# Patient Record
Sex: FEMALE | Employment: OTHER | URBAN - METROPOLITAN AREA
[De-identification: names, ages, dates, MRNs, and addresses within clinical notes are randomized per-mention and may not be internally consistent; named-entity substitution may affect disease eponyms.]

---

## 2024-03-01 ENCOUNTER — OFFICE VISIT (OUTPATIENT)
Facility: CLINIC | Age: 74
End: 2024-03-01

## 2024-03-01 DIAGNOSIS — I10 PRIMARY HYPERTENSION: ICD-10-CM

## 2024-03-01 DIAGNOSIS — O22.30 DVT (DEEP VEIN THROMBOSIS) IN PREGNANCY: ICD-10-CM

## 2024-03-01 DIAGNOSIS — K21.9 GASTROESOPHAGEAL REFLUX DISEASE WITHOUT ESOPHAGITIS: ICD-10-CM

## 2024-03-01 DIAGNOSIS — J96.01 ACUTE HYPOXIC RESPIRATORY FAILURE (HCC): Primary | ICD-10-CM

## 2024-03-01 DIAGNOSIS — I26.02 ACUTE SADDLE PULMONARY EMBOLISM WITH ACUTE COR PULMONALE (HCC): ICD-10-CM

## 2024-03-01 DIAGNOSIS — G12.21 ALS (AMYOTROPHIC LATERAL SCLEROSIS) (HCC): ICD-10-CM

## 2024-03-01 DIAGNOSIS — I63.9 CEREBROVASCULAR ACCIDENT (CVA), UNSPECIFIED MECHANISM (HCC): ICD-10-CM

## 2024-03-01 NOTE — PROGRESS NOTES
distress;  Gastrointestinal: Abdomen soft, NT, ND, no masses, normal bowel sounds;  Neurologic: Cranial nerves II through VII grossly intact, no speech or motor deficits A&O in time place and person  Skin: No rash, warm and dry;  Musculoskeletal: No erythema swelling or joint tenderness, extremities without cyanosis, neck supple, ROM intact spine and extremities;  Psychiatric: Not agitated.  Appropriate affect, mood, judgment and insight.  Genitourinary: No suprapubic tenderness or flank tenderness  Heme, lymph, immuno: No pallor;       Labs: Hemoglobin 16.3 platelet 265 sodium 137 creatinine 0.6 calcium 9.6 magnesium 2.0 albumin 3.7 BUN 22    Assessment/Plans:    Diagnosis Orders   1. Acute hypoxic respiratory failure (HCC)     Secondary to pulmonary embolism  Continue oxygen.  Continue Eliquis  High risk for decompensation and readmission to hospital.  Risk of morbidity and mortality high  Monitor symptoms   2. Acute saddle pulmonary embolism with acute cor pulmonale (HCC)     S/p Thrombectomy.   Continue Eliquis   3. DVT (deep vein thrombosis) in pregnancy     S/p IVC filter.  Continue Eliquis   4. Primary hypertension     Well-controlled.  Continue to monitor   5. ALS (amyotrophic lateral sclerosis) (HCC)     Continue Riluzole.  Follow-up neurology   6. Gastroesophageal reflux disease without esophagitis     Continue PPI   7. Cerebrovascular accident (CVA), unspecified mechanism (HCC)     Continues eliquis.             Megan Alarcon MD

## 2024-03-04 ENCOUNTER — OFFICE VISIT (OUTPATIENT)
Facility: CLINIC | Age: 74
End: 2024-03-04
Payer: MEDICARE

## 2024-03-04 VITALS
RESPIRATION RATE: 18 BRPM | TEMPERATURE: 98.2 F | SYSTOLIC BLOOD PRESSURE: 152 MMHG | WEIGHT: 182 LBS | HEART RATE: 86 BPM | DIASTOLIC BLOOD PRESSURE: 62 MMHG | OXYGEN SATURATION: 98 %

## 2024-03-04 DIAGNOSIS — J96.01 ACUTE HYPOXIC RESPIRATORY FAILURE (HCC): Primary | ICD-10-CM

## 2024-03-04 DIAGNOSIS — I10 PRIMARY HYPERTENSION: ICD-10-CM

## 2024-03-04 DIAGNOSIS — G12.21 ALS (AMYOTROPHIC LATERAL SCLEROSIS) (HCC): ICD-10-CM

## 2024-03-04 DIAGNOSIS — K21.9 GASTROESOPHAGEAL REFLUX DISEASE WITHOUT ESOPHAGITIS: ICD-10-CM

## 2024-03-04 DIAGNOSIS — I63.9 CEREBROVASCULAR ACCIDENT (CVA), UNSPECIFIED MECHANISM (HCC): ICD-10-CM

## 2024-03-04 DIAGNOSIS — I82.4Y9 ACUTE DEEP VEIN THROMBOSIS (DVT) OF PROXIMAL VEIN OF LOWER EXTREMITY, UNSPECIFIED LATERALITY (HCC): ICD-10-CM

## 2024-03-04 DIAGNOSIS — I26.02 ACUTE SADDLE PULMONARY EMBOLISM WITH ACUTE COR PULMONALE (HCC): ICD-10-CM

## 2024-03-04 PROCEDURE — 99309 SBSQ NF CARE MODERATE MDM 30: CPT | Performed by: NURSE PRACTITIONER

## 2024-03-04 PROCEDURE — 1123F ACP DISCUSS/DSCN MKR DOCD: CPT | Performed by: NURSE PRACTITIONER

## 2024-03-04 PROCEDURE — G8484 FLU IMMUNIZE NO ADMIN: HCPCS | Performed by: NURSE PRACTITIONER

## 2024-03-04 RX ORDER — AMLODIPINE BESYLATE 5 MG/1
5 TABLET ORAL DAILY
COMMUNITY

## 2024-03-04 RX ORDER — NITROFURANTOIN MACROCRYSTALS 100 MG/1
100 CAPSULE ORAL 2 TIMES DAILY
COMMUNITY

## 2024-03-04 RX ORDER — LISINOPRIL 20 MG/1
20 TABLET ORAL DAILY
COMMUNITY

## 2024-03-04 RX ORDER — GLUC/MSM/COLGN2/HYAL/ANTIARTH3 375-375-20
1 TABLET ORAL DAILY
COMMUNITY

## 2024-03-04 RX ORDER — ATORVASTATIN CALCIUM 20 MG/1
20 TABLET, FILM COATED ORAL NIGHTLY
COMMUNITY

## 2024-03-04 RX ORDER — LORAZEPAM 1 MG/1
1 TABLET ORAL DAILY PRN
COMMUNITY

## 2024-03-04 RX ORDER — RILUZOLE 50 MG/1
50 TABLET, FILM COATED ORAL EVERY 12 HOURS
COMMUNITY

## 2024-03-04 RX ORDER — OMEPRAZOLE 20 MG/1
20 CAPSULE, DELAYED RELEASE ORAL DAILY
COMMUNITY

## 2024-03-04 NOTE — PROGRESS NOTES
[facility] [place] [address] [place] [address]  Phone: [phone]  Fax: [phone]  Also available via [employer]     PLACE OF SERVICE:  [facility] [address] [place] [address]    SKILLED VISIT    Chief Complaint:   Chief Complaint   Patient presents with    Follow-up         HPI : Cyndi Doran is a 73 y.o. female here for follow up.    Previous history prior to admission:  Cyndi Doran is a 73 y.o. female with history of ALS, stroke presented to hospital with complaints of acute shortness of breath.  She was found to tachypneic and hypoxic CTA lung showed large saddle pulmonary embolism.  Patient underwent thrombectomy and IVC filter placement on 2/18/24. She has been started on Eliquis for anticoagulation.  After discharge from hospital she is now admitted to [facility] for skilled rehab.She continues on oxygen and gets short of breath with minimal exertion. She will continue follow-up with neurology for her possible ALS diagnosis. Continues on Riluzole. She  has history of stroke.   had MRI of the brain which showed right thalamic infarct.  She continues on Eliquis.    Current visit:  Patient is lying in bed today she is awake alert pleasant.  Vital signs reviewed blood pressures ranging 130-1 50 she is afebrile heart rate is rate controlled she denies any lightheadedness dizziness.  She does report constipation today.  Reports success with MiraLAX in the past we will order stat dose of MiraLAX and then start on this daily.  She is eating 25 to 50%.  She notes that she is not a fan of the food.  Will discuss to see if food preferences can be done.  Patient has a history of left foot drop and weakness in the left leg she notes started from a fall sometime ago.  She has new foot drop boots at the bedside.  She has a work up diagnosis for ALS.  She was seen provider in [facility] and notes that she is transferred

## 2024-03-06 ENCOUNTER — OFFICE VISIT (OUTPATIENT)
Facility: CLINIC | Age: 74
End: 2024-03-06
Payer: MEDICARE

## 2024-03-06 VITALS
HEART RATE: 86 BPM | WEIGHT: 187 LBS | SYSTOLIC BLOOD PRESSURE: 99 MMHG | DIASTOLIC BLOOD PRESSURE: 60 MMHG | OXYGEN SATURATION: 95 % | RESPIRATION RATE: 18 BRPM | TEMPERATURE: 98.5 F

## 2024-03-06 DIAGNOSIS — G12.21 ALS (AMYOTROPHIC LATERAL SCLEROSIS) (HCC): ICD-10-CM

## 2024-03-06 DIAGNOSIS — J96.01 ACUTE HYPOXIC RESPIRATORY FAILURE (HCC): Primary | ICD-10-CM

## 2024-03-06 DIAGNOSIS — I63.9 CEREBROVASCULAR ACCIDENT (CVA), UNSPECIFIED MECHANISM (HCC): ICD-10-CM

## 2024-03-06 DIAGNOSIS — O22.30 DVT (DEEP VEIN THROMBOSIS) IN PREGNANCY: ICD-10-CM

## 2024-03-06 DIAGNOSIS — K59.01 SLOW TRANSIT CONSTIPATION: ICD-10-CM

## 2024-03-06 DIAGNOSIS — K21.9 GASTROESOPHAGEAL REFLUX DISEASE WITHOUT ESOPHAGITIS: ICD-10-CM

## 2024-03-06 DIAGNOSIS — I10 PRIMARY HYPERTENSION: ICD-10-CM

## 2024-03-06 DIAGNOSIS — I26.02 ACUTE SADDLE PULMONARY EMBOLISM WITH ACUTE COR PULMONALE (HCC): ICD-10-CM

## 2024-03-06 PROCEDURE — 1123F ACP DISCUSS/DSCN MKR DOCD: CPT | Performed by: NURSE PRACTITIONER

## 2024-03-06 PROCEDURE — 99309 SBSQ NF CARE MODERATE MDM 30: CPT | Performed by: NURSE PRACTITIONER

## 2024-03-06 PROCEDURE — G8484 FLU IMMUNIZE NO ADMIN: HCPCS | Performed by: NURSE PRACTITIONER

## 2024-03-08 ENCOUNTER — OFFICE VISIT (OUTPATIENT)
Facility: CLINIC | Age: 74
End: 2024-03-08

## 2024-03-08 VITALS
SYSTOLIC BLOOD PRESSURE: 136 MMHG | WEIGHT: 187 LBS | DIASTOLIC BLOOD PRESSURE: 79 MMHG | RESPIRATION RATE: 18 BRPM | HEART RATE: 77 BPM | TEMPERATURE: 97.4 F | OXYGEN SATURATION: 99 %

## 2024-03-08 DIAGNOSIS — I82.4Y9 ACUTE DEEP VEIN THROMBOSIS (DVT) OF PROXIMAL VEIN OF LOWER EXTREMITY, UNSPECIFIED LATERALITY (HCC): ICD-10-CM

## 2024-03-08 DIAGNOSIS — J96.01 ACUTE HYPOXIC RESPIRATORY FAILURE (HCC): Primary | ICD-10-CM

## 2024-03-08 DIAGNOSIS — K59.01 SLOW TRANSIT CONSTIPATION: ICD-10-CM

## 2024-03-08 DIAGNOSIS — G12.21 ALS (AMYOTROPHIC LATERAL SCLEROSIS) (HCC): ICD-10-CM

## 2024-03-08 DIAGNOSIS — R53.83 OTHER FATIGUE: ICD-10-CM

## 2024-03-08 DIAGNOSIS — K21.9 GASTROESOPHAGEAL REFLUX DISEASE WITHOUT ESOPHAGITIS: ICD-10-CM

## 2024-03-08 DIAGNOSIS — I63.9 CEREBROVASCULAR ACCIDENT (CVA), UNSPECIFIED MECHANISM (HCC): ICD-10-CM

## 2024-03-08 DIAGNOSIS — I10 PRIMARY HYPERTENSION: ICD-10-CM

## 2024-03-08 DIAGNOSIS — I26.02 ACUTE SADDLE PULMONARY EMBOLISM WITH ACUTE COR PULMONALE (HCC): ICD-10-CM

## 2024-03-08 NOTE — PROGRESS NOTES
[facility] [place] [address] [place] [address]  Phone: [phone]  Fax: [phone]  Also available via [employer]     PLACE OF SERVICE:  [facility] [address] [place] [address]    SKILLED VISIT    Chief Complaint:   Chief Complaint   Patient presents with    Follow-up         HPI : Cyndi Doran is a 73 y.o. female here for follow up.    Previous history prior to admission:  Cyndi Doran is a 73 y.o. female with history of ALS, stroke presented to hospital with complaints of acute shortness of breath.  She was found to tachypneic and hypoxic CTA lung showed large saddle pulmonary embolism.  Patient underwent thrombectomy and IVC filter placement on 2/18/24. She has been started on Eliquis for anticoagulation.  After discharge from hospital she is now admitted to [facility] for skilled rehab.She continues on oxygen and gets short of breath with minimal exertion. She will continue follow-up with neurology for her possible ALS diagnosis. Continues on Riluzole. She  has history of stroke.   had MRI of the brain which showed right thalamic infarct.  She continues on Eliquis.    Current visit:    Patient is lying in bed today she is awake alert pleasant.  Vital signs reviewed blood pressures ranging . she is afebrile heart rate is rate controlled she denies any lightheadedness dizziness.  Last visit she had reported MiraLAX was ineffective and was changed to Colace 200 mg p.o. daily.  She notes this has worked a little bit better but still feels somewhat constipated.  Will order bisacodyl suppository today and she was agreeable.  She is eating  50%.  She notes that she is not a fan of the food.  Will discuss to see if food preferences can be done.  Patient has a history of left foot drop and weakness in the left leg she notes started from a fall sometime ago.  She has new foot drop boots at the bedside.  She has a work up

## 2024-03-12 ENCOUNTER — OFFICE VISIT (OUTPATIENT)
Facility: CLINIC | Age: 74
End: 2024-03-12
Payer: MEDICARE

## 2024-03-12 VITALS
SYSTOLIC BLOOD PRESSURE: 127 MMHG | HEART RATE: 81 BPM | OXYGEN SATURATION: 95 % | DIASTOLIC BLOOD PRESSURE: 74 MMHG | RESPIRATION RATE: 18 BRPM | WEIGHT: 187 LBS | TEMPERATURE: 97.1 F

## 2024-03-12 DIAGNOSIS — K59.01 SLOW TRANSIT CONSTIPATION: ICD-10-CM

## 2024-03-12 DIAGNOSIS — G12.21 ALS (AMYOTROPHIC LATERAL SCLEROSIS) (HCC): ICD-10-CM

## 2024-03-12 DIAGNOSIS — I26.02 ACUTE SADDLE PULMONARY EMBOLISM WITH ACUTE COR PULMONALE (HCC): ICD-10-CM

## 2024-03-12 DIAGNOSIS — M21.372 LEFT FOOT DROP: ICD-10-CM

## 2024-03-12 DIAGNOSIS — I10 PRIMARY HYPERTENSION: ICD-10-CM

## 2024-03-12 DIAGNOSIS — I63.9 CEREBROVASCULAR ACCIDENT (CVA), UNSPECIFIED MECHANISM (HCC): ICD-10-CM

## 2024-03-12 DIAGNOSIS — K21.9 GASTROESOPHAGEAL REFLUX DISEASE WITHOUT ESOPHAGITIS: ICD-10-CM

## 2024-03-12 DIAGNOSIS — I82.4Y9 ACUTE DEEP VEIN THROMBOSIS (DVT) OF PROXIMAL VEIN OF LOWER EXTREMITY, UNSPECIFIED LATERALITY (HCC): ICD-10-CM

## 2024-03-12 DIAGNOSIS — J96.01 ACUTE HYPOXIC RESPIRATORY FAILURE (HCC): Primary | ICD-10-CM

## 2024-03-12 DIAGNOSIS — R53.83 OTHER FATIGUE: ICD-10-CM

## 2024-03-12 PROCEDURE — 1123F ACP DISCUSS/DSCN MKR DOCD: CPT | Performed by: NURSE PRACTITIONER

## 2024-03-12 PROCEDURE — 99309 SBSQ NF CARE MODERATE MDM 30: CPT | Performed by: NURSE PRACTITIONER

## 2024-03-12 PROCEDURE — G8484 FLU IMMUNIZE NO ADMIN: HCPCS | Performed by: NURSE PRACTITIONER

## 2024-03-12 NOTE — PROGRESS NOTES
needs to bring to facility and reports she was being worked up for ALS, but hasn't had official diagnosis, but highly suspect this. She  was to have appointment at [facility] for this and was rescheduled by [facility] to 03/19/2024.    She was seen during last hospitalization due to shortness of breath and there was some question if this was due to ALS but workup revealed several pulmonary embolism.  She had thrombectomy, IVC filter and she is currently on Eliquis.  She does have continuous oxygen on which she notes she did not wear at home.  She denies any acute complaints of shortness of breath today and can wean this off as able.  She reports periodic cough, continues robitussin PRN.  Last visit she had reported feeling generally fatigue - TSH, vitamin d, b12 all normal. She was educated at bedside. She is working with PT from 3/11/24 - worked on core training, Wheelchair propulsion. she has no other acute complaints today.    PMH: TIA, CVA, ALS, hypertension anxiety, GERD    ROS:   Systems reviewed were negative unless noted below.     Medications: Reviewed in EMR and assessment and plan    Social History: Former smoker quit 30 years ago.  Reports 1 standard drinks of alcohol per week  Family History: No pertinent family.    Vitals:   Vitals:    03/12/24 0752   BP: 127/74   Pulse: 81   Resp: 18   Temp: 97.1 °F (36.2 °C)   SpO2: 95%           Exam:  Constitutional: No acute distress;   Eyes: Sclera clear, PERRLA;   Ears/nose/mouth/throat:mmm, OP clear, trachea midline;  Cardiovascular: RRR,nml S1 and S2, no rubs murmurs or gallops, no edema, no cyanosis;   Respiratory: Clear to auscultation, symmetric, no respiratory distress;  Gastrointestinal: Abdomen soft, NT, ND, no masses, normal bowel sounds;  Neurologic: Cranial nerves II through VII grossly intact, no speech or motor deficits A&O in time place and person, left leg weakness and left foot drop  Skin: No rash, warm and dry;  Musculoskeletal: No erythema swelling or joint

## 2024-03-14 ENCOUNTER — OFFICE VISIT (OUTPATIENT)
Facility: CLINIC | Age: 74
End: 2024-03-14

## 2024-03-14 VITALS
SYSTOLIC BLOOD PRESSURE: 139 MMHG | OXYGEN SATURATION: 94 % | TEMPERATURE: 96.8 F | HEART RATE: 81 BPM | WEIGHT: 187.5 LBS | DIASTOLIC BLOOD PRESSURE: 73 MMHG | RESPIRATION RATE: 16 BRPM

## 2024-03-14 DIAGNOSIS — I63.9 CEREBROVASCULAR ACCIDENT (CVA), UNSPECIFIED MECHANISM (HCC): ICD-10-CM

## 2024-03-14 DIAGNOSIS — J96.01 ACUTE HYPOXIC RESPIRATORY FAILURE (HCC): Primary | ICD-10-CM

## 2024-03-14 DIAGNOSIS — K59.01 SLOW TRANSIT CONSTIPATION: ICD-10-CM

## 2024-03-14 DIAGNOSIS — I26.02 ACUTE SADDLE PULMONARY EMBOLISM WITH ACUTE COR PULMONALE (HCC): ICD-10-CM

## 2024-03-14 DIAGNOSIS — K21.9 GASTROESOPHAGEAL REFLUX DISEASE WITHOUT ESOPHAGITIS: ICD-10-CM

## 2024-03-14 DIAGNOSIS — G12.21 ALS (AMYOTROPHIC LATERAL SCLEROSIS) (HCC): ICD-10-CM

## 2024-03-14 DIAGNOSIS — M21.372 LEFT FOOT DROP: ICD-10-CM

## 2024-03-14 DIAGNOSIS — I82.4Y9 ACUTE DEEP VEIN THROMBOSIS (DVT) OF PROXIMAL VEIN OF LOWER EXTREMITY, UNSPECIFIED LATERALITY (HCC): ICD-10-CM

## 2024-03-14 DIAGNOSIS — I10 PRIMARY HYPERTENSION: ICD-10-CM

## 2024-03-14 RX ORDER — OMEPRAZOLE 40 MG/1
40 CAPSULE, DELAYED RELEASE ORAL DAILY
COMMUNITY

## 2024-03-14 NOTE — PROGRESS NOTES
the morning and at bedtime      lisinopril (PRINIVIL;ZESTRIL) 20 MG tablet Take 1 tablet by mouth daily      Phenylbutyrate-Taurursodiol 3-1 g PACK Take 1 packet by mouth daily      [START ON 3/23/2024] Phenylbutyrate-Taurursodiol 3-1 g PACK Take 1 packet by mouth in the morning and at bedtime      sertraline (ZOLOFT) 50 MG tablet Take 1 tablet by mouth daily       No current facility-administered medications for this visit.        Assessment/Plans:    Diagnosis Orders   1. Acute hypoxic respiratory failure (HCC)     Due to pulmonary embolism  On 2LPM via nasal cannula - no SOB today, wean as able  Treated PE with eliquis, thrombectomy and IV filter   2. Acute saddle pulmonary embolism with acute cor pulmonale (HCC)     IV filter on 02/28/24  Continue eliquis   3. Constipation    Continue colace 200mg po daily  Stable    4. Primary hypertension     BP controlled   5. ALS (amyotrophic lateral sclerosis) (HCC)     Establishing neurology at [facility]  Appointment now scheduled for 03/19/24  Continue on Riluzole  Continue Relyvrio - patient noted she is not supposed to be taking this more than 5 days - reached out to pharmacy contact Rosita Thakkar and left message to return call   6. Gastroesophageal reflux disease without esophagitis     Having esophageal burning for several days  Increase omeprazole to 40mg daily   7. Cerebrovascular accident (CVA), unspecified mechanism (HCC)     Continue Eliquis  Continue statin  Work with PT OT for strengthening   8. Left foot drop   Work with PT/OT  Has AFO for left foot that's new   9. Fatigue   Check B12, Vitamin d, TSH - all normal and patient educated  Nonspecific complaints of fatigue   10. Acute DVT lower extremity   Eliquis  Has IVC filter d/t saddle PE           PAULY Hernandez - NP